# Patient Record
Sex: MALE | Race: WHITE | NOT HISPANIC OR LATINO | Employment: OTHER | URBAN - NONMETROPOLITAN AREA
[De-identification: names, ages, dates, MRNs, and addresses within clinical notes are randomized per-mention and may not be internally consistent; named-entity substitution may affect disease eponyms.]

---

## 2022-06-20 ENCOUNTER — OFFICE VISIT (OUTPATIENT)
Dept: OTOLARYNGOLOGY | Facility: CLINIC | Age: 87
End: 2022-06-20

## 2022-06-20 VITALS
BODY MASS INDEX: 23.19 KG/M2 | DIASTOLIC BLOOD PRESSURE: 65 MMHG | HEART RATE: 64 BPM | SYSTOLIC BLOOD PRESSURE: 149 MMHG | HEIGHT: 68 IN | RESPIRATION RATE: 16 BRPM | TEMPERATURE: 97.7 F | WEIGHT: 153 LBS

## 2022-06-20 DIAGNOSIS — H61.21 IMPACTED CERUMEN OF RIGHT EAR: ICD-10-CM

## 2022-06-20 DIAGNOSIS — H90.6 MIXED CONDUCTIVE AND SENSORINEURAL HEARING LOSS OF BOTH EARS: Primary | ICD-10-CM

## 2022-06-20 PROCEDURE — 99203 OFFICE O/P NEW LOW 30 MIN: CPT | Performed by: NURSE PRACTITIONER

## 2022-06-20 RX ORDER — CIPROFLOXACIN AND DEXAMETHASONE 3; 1 MG/ML; MG/ML
4 SUSPENSION/ DROPS AURICULAR (OTIC) 2 TIMES DAILY
COMMUNITY

## 2022-06-20 RX ORDER — CARBOXYMETHYLCELLULOSE SODIUM 5 MG/ML
SOLUTION/ DROPS OPHTHALMIC 3 TIMES DAILY PRN
COMMUNITY

## 2022-06-20 RX ORDER — NAPROXEN 500 MG/1
500 TABLET ORAL 2 TIMES DAILY WITH MEALS
COMMUNITY

## 2022-06-20 RX ORDER — AMLODIPINE BESYLATE AND BENAZEPRIL HYDROCHLORIDE 10; 20 MG/1; MG/1
1 CAPSULE ORAL DAILY
COMMUNITY

## 2022-06-20 RX ORDER — LISINOPRIL 40 MG/1
40 TABLET ORAL DAILY
COMMUNITY

## 2022-06-20 RX ORDER — TERAZOSIN 10 MG/1
10 CAPSULE ORAL NIGHTLY
COMMUNITY

## 2022-06-20 RX ORDER — POTASSIUM CHLORIDE 750 MG/1
10 TABLET, FILM COATED, EXTENDED RELEASE ORAL 2 TIMES DAILY
COMMUNITY

## 2022-06-20 RX ORDER — METOPROLOL TARTRATE 100 MG/1
100 TABLET ORAL 2 TIMES DAILY
COMMUNITY

## 2022-06-20 RX ORDER — FINASTERIDE 5 MG/1
5 TABLET, FILM COATED ORAL DAILY
COMMUNITY

## 2022-06-20 NOTE — PROGRESS NOTES
"YOB: 1934  Location: [facility]  Location Address: [address] [place] [address], [facility], [address] [place] [address]  Location Phone: [phone]    Chief Complaint   Patient presents with   • hearing loss       History of Present Illness  Aureliano Torres is a 87 y.o. male.  Aureliano Torres is here for evaluation of ENT complaints. The patient has had problems with hearing loss. Patient family reports he had an ear infection in April in which he lost hearing in both ears right > left.   He was given oral antibiotics which they feel has improved hearing, he has not had a repeat hearing test since that time. During the ongoing infection he also complained of some balance issues, which he feels as if has improved as well. He denies room spinning.  Patient states that he \"hears a heart beat\" inside both ears.  He declines ear pain or room spinning.  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     • carboxymethylcellulose (REFRESH PLUS) 0.5 % solution 3 (Three) Times a Day As Needed for Dry Eyes.     • ciprofloxacin-dexamethasone (CIPRODEX) 0.3-0.1 % otic suspension 4 drops 2 (Two) Times a Day.     • finasteride (PROSCAR) 5 MG tablet Take 5 mg by mouth Daily.     • lisinopril (PRINIVIL,ZESTRIL) 40 MG tablet Take 40 mg by mouth Daily.     • METFORMIN HCL PO Take 1,000 mg by mouth.     • metoprolol tartrate (LOPRESSOR) 100 MG tablet Take 100 mg by mouth 2 (Two) Times a Day.     • naproxen (NAPROSYN) 500 MG tablet Take 500 mg by " mouth 2 (Two) Times a Day With Meals.     • potassium chloride (K-DUR,KLOR-CON) 10 MEQ ER tablet Take 10 mEq by mouth 2 (Two) Times a Day.     • terazosin (HYTRIN) 10 MG capsule Take 10 mg by mouth Every Night.         Aspirin    History reviewed. No pertinent family history.    Social History     Socioeconomic History   • Marital status:    Tobacco Use   • Smoking status: Former Smoker   • Smokeless tobacco: Never Used   Vaping Use   • Vaping Use: Never used   Substance and Sexual Activity   • Alcohol use: Yes     Alcohol/week: 7.0 standard drinks     Types: 7 Cans of beer per week   • Drug use: Never       Review of Systems   Constitutional: Negative.    HENT: Positive for hearing loss and tinnitus. Negative for ear discharge and ear pain.    Eyes: Negative.    Respiratory: Negative.    Cardiovascular: Negative.    Genitourinary: Negative.    Neurological: Negative for dizziness.       Vitals:    06/20/22 1454   BP: 149/65   Pulse: 64   Resp: 16   Temp: 97.7 °F (36.5 °C)       Body mass index is 23.26 kg/m².    Objective     Physical Exam  Vitals reviewed.   Constitutional:       Appearance: He is normal weight.   HENT:      Head: Normocephalic.      Right Ear: Tympanic membrane, ear canal and external ear normal. Decreased hearing noted.      Left Ear: Tympanic membrane, ear canal and external ear normal. Decreased hearing noted.      Nose: Nose normal.      Mouth/Throat:      Lips: Pink.      Mouth: Mucous membranes are moist.   Musculoskeletal:      Cervical back: Full passive range of motion without pain and normal range of motion.   Neurological:      Mental Status: He is alert.         Assessment & Plan   Diagnoses and all orders for this visit:    1. 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 Impacted cerumen of right ear      * Surgery not found *  No orders of the defined types were placed in this encounter.    Return for Recheck.     Will obtain a repeat hearing  test as va hearing test was performed during reported active otitis media     Patient Instructions   CONTACT INFORMATION:  The main office phone number is [phone]. For emergencies after hours and on weekends, this number will convert over to our answering service and the on call provider will answer. Please try to keep non emergent phone calls/ questions to office hours 9am-5pm Monday through Friday.      Twinklr  As an alternative, you can sign up and use the Epic MyChart system for more direct and quicker access for non emergent questions/ problems.  [employer] allows you to send messages to your doctor, view your test results, renew your prescriptions, schedule appointments, and more. To sign up, go to [place] and click on the Sign Up Now link in the New User? box. Enter your Twinklr Activation Code exactly as it appears below along with the last four digits of your Social Security Number and your Date of Birth () to complete the sign-up process. If you do not sign up before the expiration date, you must request a new code.     Twinklr Activation Code: Activation code not generated  Current Twinklr Status: Active     If you have questions, you can email [email or web address] or call [phone] to talk to our Twinklr staff. Remember, Twinklr is NOT to be used for urgent needs. For medical emergencies, dial 911.     IF YOU SMOKE OR USE TOBACCO PLEASE READ THE FOLLOWING:  Why is smoking bad for me?  Smoking increases the risk of heart disease, lung disease, vascular disease, stroke, and cancer. If you smoke, STOP!        IF YOU SMOKE OR USE TOBACCO PLEASE READ THE FOLLOWING:  Why is smoking bad for me?  Smoking increases the risk of heart disease, lung disease, vascular disease, stroke, and cancer. If you smoke, STOP!     For more information:  Quit Now [facility]  -QUIT-NOW  [email or web address].[email or web address]/en-[email or web address]/ HEARING LOSS       Hearing loss is the third most  common health condition in the [place] affecting more than 1 of every 10 people.  This means that over 28 million Americans suffer from hearing loss.  The condition varies with age: 1 out of every 25 children, 1 out of every 14 aged 14-44 years old, 1 out of every 7 adults aged 45-65 year old, and 1 out of every 3 adults over the age of 65 years old.    The ear works by receiving sound vibrations, amplifying the sound, and then converting the mechanical vibration into an electrical signal that is sent to the hearing center of the brain.  The ear has three basic parts, each with very specific functions:  The External Ear   This is made up of the parts of the ear you can see (the auricle), and the ear canal.  These structures function to funnel the sound vibrations down into the ear so that they can be processed.  The Middle Ear  The external ear and the middle ear are  by the eardrum (tympanic membrane).  The middle ear is an air-filled space that houses the middle ear bones (ossicles).  Sound waves hit the eardrum and cause it to vibrate.  The vibrations are then transferred to the ear bones that amplify the sound and transfer it to the inner ear (cochlea).  The Inner Ear  The inner ear is a snail-shaped bone that contains a fluid-filled membrane inside another fluid-filled membrane.  It acts like a battery to transform the mechanical vibrations into and electrical signal.  It does this with very delicate hair cells that rest on top of the inner ear membranes.  The electrical signal is then shipped out of the inner ear to the brain where it is processed and understood as sound.    Disease in any one of these parts of the ear can cause hearing loss, but can do so in two different ways.  When there is a problem with the process of bringing the sound to the inner ear (i.e. problems in the external or middle ear) it is called a conductive hearing loss.  Problems in the inner ear or nerves of hearing care  called sensorineural hearing losses.  Often times, however, there is a combination of the types of hearing losses or a mixed hearing loss.  Conductive Hearing Loss  Impairment of the transfer of sound into the inner ear because of problems with the external ear, the eardrum or the middle ear can cause conductive hearing loss.  Conductive losses can often be lessened or cured with medication or surgery, depending on the etiology.  Causes of Conductive Hearing Losses:  • Wax occlusion of the ear canal  • Eardrum perforations  • Stiffened or scarred eardrums  • Fluid in the middle earspace (otitis media with effusion)  • Middle ear infections (acute otitis media)  • Scarring or fixation of the ear bones (tympanosclerosis, otosclerosis)  • Dislocation of the ear bones  • Cholesteatoma: this is a “skin cyst” that develops due to severely retracted eardrums or from skin growth into the middle ear from a chronic eardrum perforation.  Over time the cyst can grow and erode the bones of hearing.  • Middle ear tumors or masses  Sensorineural Hearing Loss  Deficits in hearing due to problems in the inner ear or nerves of hearing are termed sensorineural losses.  These are usually due to damage to the microscopic hair cells in the cochlea, or due to loss of the nerve cells in the hearing nerve.  For the most part, this type of hearing loss cannot be repaired with medication or surgery, except in rare instances.  Often times, however, it can be helped with hearing aids and rarely with cochlear implantation.  This type of loss can also be associated with tinnitus (ringing of the ears) and vertigo (dizziness).  Causes of Sensorineural Hearing Losses:  • Damage from noise exposure  • Aging:  this is often a slow, progressive loss of the high frequencies  • Infection (labrynthitis)  • Secondary loss from the effects of meningitis  • Genetic/familial related hearing loss  • Autoimmune hearing loss (a rheumatoid-like  process)  • Temporal bond fracture (severe fracture of the bone around or through the inner ear)  • Medication induced damage (chemotherapy, high dose IV antibiotics, diuretics)  • Inner ear and skull base tumors (acoustic neuroma, memingioma)        Specific Causes    Ear Infections and Effusion in Children  Any time the middle ear is filled with fluid, as in an active infection or in middle ear effusion, there can be a conductive hearing.  Ear infections are the most common cause of infant hearing loss and are a very treatable type of hearing loss.  Usually the middle ear is filled with air, which allows the bones of hearing to freely move.  When there is fluid in this space it slows the vibration and is literally like trying to hear underwater.  Most of the time this infection or fluid can be treated with medication.  If this does not happen, often myringotomy tube placement can allow for drainage of fluid and allow the middle ear space to remain ventilated.  Congenital Hearing Loss  Hearing loss is the most common birth defect, affecting 3 in every 1000 children born in the [place].  If untreated, this can lead to significant problems in speech, language, and learning.  Recently, most hospitals have early infant screening that can identify newborns with hearing problems.  With accurate diagnosis, these children can often be treated with hearing aids and other forms of treatment that can allow them to develop normal speech and learning.  Noise Induced Hearing Loss  According to the National Institutes of Health, approximately 10 million Americans have hearing losses that are a least partially attributable to loud noise exposure.  Exposures that can cause permanent damage vary, but short exposures of very loud quality (gunfire) can be as bad as longer exposures at lower levels.  Most conversations are at about 65-70 decibels.  Levels over 85dB, with exposures of up to 8 hours a day, will produce permanent  hearing loss after many years of exposure.  A stereo headset turned up full blast (about 110 dB) can cause a significant hearing loss in less than a half an hour.  These losses are due to damage of the hair cells of the inner ear from the excessive vibrational acoustic trauma of the loud noise.  Since this loss is usually nonreversible, hearing protection is essential.  Foam earplugs can provide 15-30dB of noise protection.  Age Related Hearing Loss (Presbyacusis)  Age related hearing loss is another very common form of hearing loss in the elderly.  It is usually a slowly progressive, high frequency sensorineural loss that in nonreversible.  Not all elderly people will have hearing loss as this is very much related to the genetic makeup of the individual.  This can cause the patient to withdraw from social situations, or cause them to seem to have a mood change because of the frustration of not being able to hear a conversation or having to ask people to repeat things.  Most of the time, this can be well treated by amplifying sounds with a hearing aid.    Hearing loss can have a large impact in the way we function on a day to day basis with our environment.  We are fortunate that most hearing loss can be treated by hearing aids or medical and surgical treatments.  If you think you may be suffering from hearing loss, an evaluation by our doctor can help identify the cause and the specific treatment that can help improve your hearing.

## 2022-06-20 NOTE — PATIENT INSTRUCTIONS
CONTACT INFORMATION:  The main office phone number is [phone]. For emergencies after hours and on weekends, this number will convert over to our answering service and the on call provider will answer. Please try to keep non emergent phone calls/ questions to office hours 9am-5pm Monday through Friday.      Zazum  As an alternative, you can sign up and use the Epic MyChart system for more direct and quicker access for non emergent questions/ problems.  [employer] allows you to send messages to your doctor, view your test results, renew your prescriptions, schedule appointments, and more. To sign up, go to [employer] and click on the Sign Up Now link in the New User? box. Enter your Zazum Activation Code exactly as it appears below along with the last four digits of your Social Security Number and your Date of Birth () to complete the sign-up process. If you do not sign up before the expiration date, you must request a new code.     Zazum Activation Code: Activation code not generated  Current Zazum Status: Active     If you have questions, you can email [email or web address] or call [phone] to talk to our Zazum staff. Remember, Zazum is NOT to be used for urgent needs. For medical emergencies, dial 911.     IF YOU SMOKE OR USE TOBACCO PLEASE READ THE FOLLOWING:  Why is smoking bad for me?  Smoking increases the risk of heart disease, lung disease, vascular disease, stroke, and cancer. If you smoke, STOP!        IF YOU SMOKE OR USE TOBACCO PLEASE READ THE FOLLOWING:  Why is smoking bad for me?  Smoking increases the risk of heart disease, lung disease, vascular disease, stroke, and cancer. If you smoke, STOP!     For more information:  Quit Now [place]  -QUIT-NOW  [email or web address]://[email or web address].[email or web address]/[email or web address]/ HEARING LOSS       Hearing loss is the third most common health condition in the [place] affecting more than 1 of every 10 people.  This means that  over 28 million Americans suffer from hearing loss.  The condition varies with age: 1 out of every 25 children, 1 out of every 14 aged 14-44 years old, 1 out of every 7 adults aged 45-65 year old, and 1 out of every 3 adults over the age of 65 years old.    The ear works by receiving sound vibrations, amplifying the sound, and then converting the mechanical vibration into an electrical signal that is sent to the hearing center of the brain.  The ear has three basic parts, each with very specific functions:  The External Ear   This is made up of the parts of the ear you can see (the auricle), and the ear canal.  These structures function to funnel the sound vibrations down into the ear so that they can be processed.  The Middle Ear  The external ear and the middle ear are  by the eardrum (tympanic membrane).  The middle ear is an air-filled space that houses the middle ear bones (ossicles).  Sound waves hit the eardrum and cause it to vibrate.  The vibrations are then transferred to the ear bones that amplify the sound and transfer it to the inner ear (cochlea).  The Inner Ear  The inner ear is a snail-shaped bone that contains a fluid-filled membrane inside another fluid-filled membrane.  It acts like a battery to transform the mechanical vibrations into and electrical signal.  It does this with very delicate hair cells that rest on top of the inner ear membranes.  The electrical signal is then shipped out of the inner ear to the brain where it is processed and understood as sound.    Disease in any one of these parts of the ear can cause hearing loss, but can do so in two different ways.  When there is a problem with the process of bringing the sound to the inner ear (i.e. problems in the external or middle ear) it is called a conductive hearing loss.  Problems in the inner ear or nerves of hearing care called sensorineural hearing losses.  Often times, however, there is a combination of the types of  hearing losses or a mixed hearing loss.  Conductive Hearing Loss  Impairment of the transfer of sound into the inner ear because of problems with the external ear, the eardrum or the middle ear can cause conductive hearing loss.  Conductive losses can often be lessened or cured with medication or surgery, depending on the etiology.  Causes of Conductive Hearing Losses:  Wax occlusion of the ear canal  Eardrum perforations  Stiffened or scarred eardrums  Fluid in the middle earspace (otitis media with effusion)  Middle ear infections (acute otitis media)  Scarring or fixation of the ear bones (tympanosclerosis, otosclerosis)  Dislocation of the ear bones  Cholesteatoma: this is a “skin cyst” that develops due to severely retracted eardrums or from skin growth into the middle ear from a chronic eardrum perforation.  Over time the cyst can grow and erode the bones of hearing.  Middle ear tumors or masses  Sensorineural Hearing Loss  Deficits in hearing due to problems in the inner ear or nerves of hearing are termed sensorineural losses.  These are usually due to damage to the microscopic hair cells in the cochlea, or due to loss of the nerve cells in the hearing nerve.  For the most part, this type of hearing loss cannot be repaired with medication or surgery, except in rare instances.  Often times, however, it can be helped with hearing aids and rarely with cochlear implantation.  This type of loss can also be associated with tinnitus (ringing of the ears) and vertigo (dizziness).  Causes of Sensorineural Hearing Losses:  Damage from noise exposure  Aging:  this is often a slow, progressive loss of the high frequencies  Infection (labrynthitis)  Secondary loss from the effects of meningitis  Genetic/familial related hearing loss  Autoimmune hearing loss (a rheumatoid-like process)  Temporal bond fracture (severe fracture of the bone around or through the inner ear)  Medication induced damage (chemotherapy, high dose  IV antibiotics, diuretics)  Inner ear and skull base tumors (acoustic neuroma, memingioma)        Specific Causes    Ear Infections and Effusion in Children  Any time the middle ear is filled with fluid, as in an active infection or in middle ear effusion, there can be a conductive hearing.  Ear infections are the most common cause of infant hearing loss and are a very treatable type of hearing loss.  Usually the middle ear is filled with air, which allows the bones of hearing to freely move.  When there is fluid in this space it slows the vibration and is literally like trying to hear underwater.  Most of the time this infection or fluid can be treated with medication.  If this does not happen, often myringotomy tube placement can allow for drainage of fluid and allow the middle ear space to remain ventilated.  Congenital Hearing Loss  Hearing loss is the most common birth defect, affecting 3 in every 1000 children born in the [place].  If untreated, this can lead to significant problems in speech, language, and learning.  Recently, most hospitals have early infant screening that can identify newborns with hearing problems.  With accurate diagnosis, these children can often be treated with hearing aids and other forms of treatment that can allow them to develop normal speech and learning.  Noise Induced Hearing Loss  According to the National Institutes of Health, approximately 10 million Americans have hearing losses that are a least partially attributable to loud noise exposure.  Exposures that can cause permanent damage vary, but short exposures of very loud quality (gunfire) can be as bad as longer exposures at lower levels.  Most conversations are at about 65-70 decibels.  Levels over 85dB, with exposures of up to 8 hours a day, will produce permanent hearing loss after many years of exposure.  A stereo headset turned up full blast (about 110 dB) can cause a significant hearing loss in less than a half  an hour.  These losses are due to damage of the hair cells of the inner ear from the excessive vibrational acoustic trauma of the loud noise.  Since this loss is usually nonreversible, hearing protection is essential.  Foam earplugs can provide 15-30dB of noise protection.  Age Related Hearing Loss (Presbyacusis)  Age related hearing loss is another very common form of hearing loss in the elderly.  It is usually a slowly progressive, high frequency sensorineural loss that in nonreversible.  Not all elderly people will have hearing loss as this is very much related to the genetic makeup of the individual.  This can cause the patient to withdraw from social situations, or cause them to seem to have a mood change because of the frustration of not being able to hear a conversation or having to ask people to repeat things.  Most of the time, this can be well treated by amplifying sounds with a hearing aid.    Hearing loss can have a large impact in the way we function on a day to day basis with our environment.  We are fortunate that most hearing loss can be treated by hearing aids or medical and surgical treatments.  If you think you may be suffering from hearing loss, an evaluation by our doctor can help identify the cause and the specific treatment that can help improve your hearing.

## 2022-07-08 NOTE — PROGRESS NOTES
AUDIOMETRIC EVALUATION      Name:  Aureliano Torres  :  1934  Age:  87 y.o.  Date of Evaluation:  2022       History:  Reason for visit:  Mr. Torres is seen today at the request of ROBER Marx for a hearing evaluation. Patient was referred to ENT clinic for bilateral mixed hearing loss. Patient is here today with his two daughters. Patient's daughter reports he lost his hearing around 2022. She states he went to the ER because he had fluid in both ears. He was put on antibiotics at this time. Patient reports patient his hearing has improved since this. [facility]. He does not wear hearing aids and has never worn hearing aids.     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 Guns (right-handed) no hearing protection  Aural Fullness:  no, both ears  Otalgia: no, both ears  Family history of hearing loss: yes, mothers side of family  Other significant history: high blood presure, type II diabetes      EVALUATION:            RESULTS:    Otoscopic Evaluation:  Bilateral: minimal cerumen, tympanic membrane visualized     Tympanometry (226 Hz):  Bilateral: Type As- low static compliance    Pure Tone Audiometry:    Right: mild precipitously sloping to severe mixed hearing loss   Left: mild precipitously sloping to profound sensorineural hearing loss     Speech Audiometry:   Right: Speech Reception Threshold (SRT) was obtained at 60 dBHL  Word Recognition scores- poor (54 - 64 %) using NU-6 List 4A, 25 words  Left: Speech Reception Threshold (SRT) was obtained at 40 dBHL  Word Recognition scores- good (78 - 88%) using NU-6 List 4A, 25 words    IMPRESSIONS:  Tympanometry showed normal middle ear pressure with decreased static compliance, consistent with hypomobile tympanic membrane,  for both ears. Pure tone thresholds for the right ear show a mild precipitously sloping to severe mixed hearing loss, suggesting abnormal outer/middle ear function and abnormal cochlear/retrocochlear function. Pure tone thresholds for the left ear show a mild precipitously sloping to profound sensorineural hearing loss, suggesting normal outer/middle ear function and abnormal cochlear/retrocochlear function. No significant improvement [facility]. Significant improvement [facility]. Large asymmetry with the right ear significantly worse than the left ear. Patient and daughters were counseled with regard to the findings.    Amplification needs:  Patient could benefit from hearing aids. Patient's word recognition scores were poor and good    Diagnosis:  1. Mixed conductive and sensorineural hearing loss of right ear with restricted hearing of left ear    2. Sensorineural hearing loss (SNHL) of left ear with restricted hearing of right ear         RECOMMENDATIONS/PLAN:  Follow-up recommendations per ROBER Marx    Audiologic follow-up in 1 year  Return for audiologic testing if noticing changes in hearing or concerns arise  Hearing aid evaluation and counseling upon medical clearance and patient motivation  Use communication strategies  Use hearing protection around loud noises     EDUCATION:  Discussed results and recommendations with patient. Questions were addressed and the patient was encouraged to contact our department should concerns arise.        SUSANA Elmore  Licensed Audiologist

## 2022-07-11 ENCOUNTER — OFFICE VISIT (OUTPATIENT)
Dept: OTOLARYNGOLOGY | Facility: CLINIC | Age: 87
End: 2022-07-11

## 2022-07-11 ENCOUNTER — PROCEDURE VISIT (OUTPATIENT)
Dept: OTOLARYNGOLOGY | Facility: CLINIC | Age: 87
End: 2022-07-11

## 2022-07-11 VITALS
HEART RATE: 55 BPM | DIASTOLIC BLOOD PRESSURE: 73 MMHG | WEIGHT: 150.6 LBS | BODY MASS INDEX: 22.82 KG/M2 | TEMPERATURE: 97.7 F | SYSTOLIC BLOOD PRESSURE: 151 MMHG | HEIGHT: 68 IN

## 2022-07-11 DIAGNOSIS — H90.A31 MIXED CONDUCTIVE AND SENSORINEURAL HEARING LOSS OF RIGHT EAR WITH RESTRICTED HEARING OF LEFT EAR: Primary | ICD-10-CM

## 2022-07-11 DIAGNOSIS — H90.A22 SENSORINEURAL HEARING LOSS (SNHL) OF LEFT EAR WITH RESTRICTED HEARING OF RIGHT EAR: ICD-10-CM

## 2022-07-11 DIAGNOSIS — H93.13 TINNITUS OF BOTH EARS: ICD-10-CM

## 2022-07-11 DIAGNOSIS — H90.6 MIXED CONDUCTIVE AND SENSORINEURAL HEARING LOSS OF BOTH EARS: ICD-10-CM

## 2022-07-11 PROCEDURE — 99213 OFFICE O/P EST LOW 20 MIN: CPT | Performed by: NURSE PRACTITIONER

## 2022-07-11 PROCEDURE — 92557 COMPREHENSIVE HEARING TEST: CPT

## 2022-07-11 PROCEDURE — 92567 TYMPANOMETRY: CPT

## 2022-07-11 NOTE — PATIENT INSTRUCTIONS
TINNITUS  Tinnitus (latin for ringing) is the sensation of noise in the ears. This symptom can be quite variable and disconcerting. Most people have had ringing in the ears but most do not require treatment. Only 6% of people have ringing troubling enough to seek treatment.    Symptoms can range from ringing to “noise” of any sort in the ear or ears. Timing can vary as well. There are no specific symptom requirements to have tinnitus. It is speculated that the inner ear hair cells (responsible for hearing) may be dying and causing the brain to seek additional input for sound that is missing. There are many theories for the etiology of tinnitus or ringing.    You may be referred for hearing testing or imaging of the ears/brain to try to determine what is causing ringing and how to best treat the condition.    Tinnitus Recommendations-  >Avoid loud or sudden noise  >Wear hearing protection in the presence of loud noise  >Avoid irritants like caffeine, nicotine, tonic water, malaria medications, alcohol, aspirin- please tell MD if you are taking any of these medications  >In a quiet environment or while sleeping, use a white noise generator or radio station to provide background noise  >Relaxation and stress management techniques are useful  >Biofeedback  >Complementary medicine may be of benefit  >Wear a hearing aid or tinnitus masker/retrainer  >Psychological counseling may be beneficial in some situations  >Exercise!!  >Restorative Sleep!!    Medical therapy for ringing (may include)-  Do nothing  Medications for ringing  IV medication  Ear perfusion- inner ear surgery to reduce ringing  Hearing Aids, Tinnitus maskers, Tinnitus retrainers  Biofeedback  Psychological counseling    All options will be reviewed at your visit. Treating tinnitus can be difficult and frustrating. There really is no cure but rather control. This can be time consuming to treat. The patient determines how much treatment is warranted if there  are no associated medical conditions requiring therapy. Please be patient.   HEARING LOSS       Hearing loss is the third most common health condition in the [place] affecting more than 1 of every 10 people.  This means that over 28 million Americans suffer from hearing loss.  The condition varies with age: 1 out of every 25 children, 1 out of every 14 aged 14-44 years old, 1 out of every 7 adults aged 45-65 year old, and 1 out of every 3 adults over the age of 65 years old.    The ear works by receiving sound vibrations, amplifying the sound, and then converting the mechanical vibration into an electrical signal that is sent to the hearing center of the brain.  The ear has three basic parts, each with very specific functions:  The External Ear   This is made up of the parts of the ear you can see (the auricle), and the ear canal.  These structures function to funnel the sound vibrations down into the ear so that they can be processed.  The Middle Ear  The external ear and the middle ear are  by the eardrum (tympanic membrane).  The middle ear is an air-filled space that houses the middle ear bones (ossicles).  Sound waves hit the eardrum and cause it to vibrate.  The vibrations are then transferred to the ear bones that amplify the sound and transfer it to the inner ear (cochlea).  The Inner Ear  The inner ear is a snail-shaped bone that contains a fluid-filled membrane inside another fluid-filled membrane.  It acts like a battery to transform the mechanical vibrations into and electrical signal.  It does this with very delicate hair cells that rest on top of the inner ear membranes.  The electrical signal is then shipped out of the inner ear to the brain where it is processed and understood as sound.    Disease in any one of these parts of the ear can cause hearing loss, but can do so in two different ways.  When there is a problem with the process of bringing the sound to the inner ear (i.e.  problems in the external or middle ear) it is called a conductive hearing loss.  Problems in the inner ear or nerves of hearing care called sensorineural hearing losses.  Often times, however, there is a combination of the types of hearing losses or a mixed hearing loss.  Conductive Hearing Loss  Impairment of the transfer of sound into the inner ear because of problems with the external ear, the eardrum or the middle ear can cause conductive hearing loss.  Conductive losses can often be lessened or cured with medication or surgery, depending on the etiology.  Causes of Conductive Hearing Losses:  Wax occlusion of the ear canal  Eardrum perforations  Stiffened or scarred eardrums  Fluid in the middle earspace (otitis media with effusion)  Middle ear infections (acute otitis media)  Scarring or fixation of the ear bones (tympanosclerosis, otosclerosis)  Dislocation of the ear bones  Cholesteatoma: this is a “skin cyst” that develops due to severely retracted eardrums or from skin growth into the middle ear from a chronic eardrum perforation.  Over time the cyst can grow and erode the bones of hearing.  Middle ear tumors or masses  Sensorineural Hearing Loss  Deficits in hearing due to problems in the inner ear or nerves of hearing are termed sensorineural losses.  These are usually due to damage to the microscopic hair cells in the cochlea, or due to loss of the nerve cells in the hearing nerve.  For the most part, this type of hearing loss cannot be repaired with medication or surgery, except in rare instances.  Often times, however, it can be helped with hearing aids and rarely with cochlear implantation.  This type of loss can also be associated with tinnitus (ringing of the ears) and vertigo (dizziness).  Causes of Sensorineural Hearing Losses:  Damage from noise exposure  Aging:  this is often a slow, progressive loss of the high frequencies  Infection (labrynthitis)  Secondary loss from the effects of  meningitis  Genetic/familial related hearing loss  Autoimmune hearing loss (a rheumatoid-like process)  Temporal bond fracture (severe fracture of the bone around or through the inner ear)  Medication induced damage (chemotherapy, high dose IV antibiotics, diuretics)  Inner ear and skull base tumors (acoustic neuroma, memingioma)        Specific Causes    Ear Infections and Effusion in Children  Any time the middle ear is filled with fluid, as in an active infection or in middle ear effusion, there can be a conductive hearing.  Ear infections are the most common cause of infant hearing loss and are a very treatable type of hearing loss.  Usually the middle ear is filled with air, which allows the bones of hearing to freely move.  When there is fluid in this space it slows the vibration and is literally like trying to hear underwater.  Most of the time this infection or fluid can be treated with medication.  If this does not happen, often myringotomy tube placement can allow for drainage of fluid and allow the middle ear space to remain ventilated.  Congenital Hearing Loss  Hearing loss is the most common birth defect, affecting 3 in every 1000 children born in the [place].  If untreated, this can lead to significant problems in speech, language, and learning.  Recently, most hospitals have early infant screening that can identify newborns with hearing problems.  With accurate diagnosis, these children can often be treated with hearing aids and other forms of treatment that can allow them to develop normal speech and learning.  Noise Induced Hearing Loss  According to the [employer], approximately 10 million Americans have hearing losses that are a least partially attributable to loud noise exposure.  Exposures that can cause permanent damage vary, but short exposures of very loud quality (gunfire) can be as bad as longer exposures at lower levels.  Most conversations are at about 65-70  decibels.  Levels over 85dB, with exposures of up to 8 hours a day, will produce permanent hearing loss after many years of exposure.  A stereo headset turned up full blast (about 110 dB) can cause a significant hearing loss in less than a half an hour.  These losses are due to damage of the hair cells of the inner ear from the excessive vibrational acoustic trauma of the loud noise.  Since this loss is usually nonreversible, hearing protection is essential.  Foam earplugs can provide 15-30dB of noise protection.  Age Related Hearing Loss (Presbyacusis)  Age related hearing loss is another very common form of hearing loss in the elderly.  It is usually a slowly progressive, high frequency sensorineural loss that in nonreversible.  Not all elderly people will have hearing loss as this is very much related to the genetic makeup of the individual.  This can cause the patient to withdraw from social situations, or cause them to seem to have a mood change because of the frustration of not being able to hear a conversation or having to ask people to repeat things.  Most of the time, this can be well treated by amplifying sounds with a hearing aid.    Hearing loss can have a large impact in the way we function on a day to day basis with our environment.  We are fortunate that most hearing loss can be treated by hearing aids or medical and surgical treatments.  If you think you may be suffering from hearing loss, an evaluation by our doctor can help identify the cause and the specific treatment that can help improve your hearing.

## 2022-07-11 NOTE — PROGRESS NOTES
YOB: 1934  Location: [facility]  Location Address: [address] [place] [address], [facility], [address] [place] [address]  Location Phone: [phone]    Chief Complaint   Patient presents with   • Ear Problem     Hearing loss follow up        History of Present Illness  Aureliano Torres is a 87 y.o. male.  Aureliano Torres is here for follow up of ENT complaints. The patient has had problems with hearing loss.   Patient states his hearing has been relatively unchanged since last visit   He denies otalgia or otorrhea  He is wanting to move forward with hearing aids at this time   Patient was diagnosed with right otitis in April, but he states symptoms have improved since taking antibiotics at that time   He continues to have ongoing tinnitus, denies dizziness, balance issues or room spinning   Patient states he has significant work related noise exposure in the past as well as family history of hearing loss         Procedure visit with Fide Moore AUD (2022)       Past Medical History:   Diagnosis Date   • Balance problem    • Hearing loss    • Hypertension        Past Surgical History:   Procedure Laterality Date   • CYST REMOVAL     • HERNIA REPAIR      twice       Outpatient Medications Marked as Taking for the 22 encounter (Office Visit) with Kenia Rodriguez APRN   Medication Sig Dispense Refill   • amLODIPine-benazepril (LOTREL) 10-20 MG per capsule Take 1 capsule by mouth Daily.     • carboxymethylcellulose (REFRESH PLUS) 0.5 % solution 3 (Three) Times a Day As Needed for Dry Eyes.     • ciprofloxacin-dexamethasone (CIPRODEX) 0.3-0.1 % otic suspension 4 drops 2 (Two) Times a Day.     • finasteride (PROSCAR) 5 MG tablet Take 5 mg by mouth Daily.     • lisinopril (PRINIVIL,ZESTRIL) 40 MG tablet Take 40 mg by mouth Daily.     • METFORMIN HCL PO Take 1,000 mg by mouth.     • metoprolol tartrate (LOPRESSOR) 100 MG tablet Take 100 mg by mouth 2 (Two) Times a Day.     • naproxen (NAPROSYN) 500 MG  tablet Take 500 mg by mouth 2 (Two) Times a Day With Meals.     • potassium chloride (K-DUR,KLOR-CON) 10 MEQ ER tablet Take 10 mEq by mouth 2 (Two) Times a Day.     • terazosin (HYTRIN) 10 MG capsule Take 10 mg by mouth Every Night.         Aspirin    History reviewed. No pertinent family history.    Social History     Socioeconomic History   • Marital status:    Tobacco Use   • Smoking status: Former Smoker   • Smokeless tobacco: Never Used   Vaping Use   • Vaping Use: Never used   Substance and Sexual Activity   • Alcohol use: Yes     Alcohol/week: 7.0 standard drinks     Types: 7 Cans of beer per week   • Drug use: Never       Review of Systems   Constitutional: Negative.    HENT: Positive for hearing loss and tinnitus. Negative for ear discharge and ear pain.    Eyes: Negative.    Respiratory: Negative.    Cardiovascular: Negative.    Gastrointestinal: Negative.    Endocrine: Negative.    Genitourinary: Negative.    Neurological: Negative.  Negative for dizziness.       Vitals:    07/11/22 0934   BP: 151/73   Pulse: 55   Temp: 97.7 °F (36.5 °C)       Body mass index is 22.9 kg/m².    Objective     Physical Exam  Vitals reviewed.   Constitutional:       Appearance: He is normal weight.   HENT:      Head: Normocephalic.      Right Ear: Tympanic membrane, ear canal and external ear normal. Decreased hearing noted.      Left Ear: Tympanic membrane, ear canal and external ear normal. Decreased hearing noted.      Nose: Nose normal.   Musculoskeletal:      Cervical back: Full passive range of motion without pain.   Neurological:      Mental Status: He is alert.         Assessment & Plan   Diagnoses and all orders for this visit:    1. Mixed conductive and sensorineural hearing loss of right ear with restricted hearing of left ear (Primary)    2. Sensorineural hearing loss (SNHL) of left ear with restricted hearing of right ear    3. Mixed conductive and sensorineural hearing loss of both ears    4. Tinnitus of  both ears      * Surgery not found *  No orders of the defined types were placed in this encounter.    Return in about 6 months (around 1/11/2023) for Recheck.     Discussed options of ct scan/mri to evaluate asymmetrical hearing loss/pulsatile tinnitus   Patient and family would like to postpone at this time and move forward with hearing aids considering his age and symptom improvement since initial infection in April   Discussed need for ongoing monitoring of hearing and routine follow up      Patient Instructions   TINNITUS  Tinnitus (latin for ringing) is the sensation of noise in the ears. This symptom can be quite variable and disconcerting. Most people have had ringing in the ears but most do not require treatment. Only 6% of people have ringing troubling enough to seek treatment.    Symptoms can range from ringing to “noise” of any sort in the ear or ears. Timing can vary as well. There are no specific symptom requirements to have tinnitus. It is speculated that the inner ear hair cells (responsible for hearing) may be dying and causing the brain to seek additional input for sound that is missing. There are many theories for the etiology of tinnitus or ringing.    You may be referred for hearing testing or imaging of the ears/brain to try to determine what is causing ringing and how to best treat the condition.    Tinnitus Recommendations-  >Avoid loud or sudden noise  >Wear hearing protection in the presence of loud noise  >Avoid irritants like caffeine, nicotine, tonic water, malaria medications, alcohol, aspirin- please tell MD if you are taking any of these medications  >In a quiet environment or while sleeping, use a white noise generator or radio station to provide background noise  >Relaxation and stress management techniques are useful  >Biofeedback  >Complementary medicine may be of benefit  >Wear a hearing aid or tinnitus masker/retrainer  >Psychological counseling may be beneficial in some  situations  >Exercise!!  >Restorative Sleep!!    Medical therapy for ringing (may include)-  Do nothing  Medications for ringing  IV medication  Ear perfusion- inner ear surgery to reduce ringing  Hearing Aids, Tinnitus maskers, Tinnitus retrainers  Biofeedback  Psychological counseling    All options will be reviewed at your visit. Treating tinnitus can be difficult and frustrating. There really is no cure but rather control. This can be time consuming to treat. The patient determines how much treatment is warranted if there are no associated medical conditions requiring therapy. Please be patient.   HEARING LOSS       Hearing loss is the third most common health condition in the [place] affecting more than 1 of every 10 people.  This means that over 28 million Americans suffer from hearing loss.  The condition varies with age: 1 out of every 25 children, 1 out of every 14 aged 14-44 years old, 1 out of every 7 adults aged 45-65 year old, and 1 out of every 3 adults over the age of 65 years old.    The ear works by receiving sound vibrations, amplifying the sound, and then converting the mechanical vibration into an electrical signal that is sent to the hearing center of the brain.  The ear has three basic parts, each with very specific functions:  The External Ear   This is made up of the parts of the ear you can see (the auricle), and the ear canal.  These structures function to funnel the sound vibrations down into the ear so that they can be processed.  The Middle Ear  The external ear and the middle ear are  by the eardrum (tympanic membrane).  The middle ear is an air-filled space that houses the middle ear bones (ossicles).  Sound waves hit the eardrum and cause it to vibrate.  The vibrations are then transferred to the ear bones that amplify the sound and transfer it to the inner ear (cochlea).  The Inner Ear  The inner ear is a snail-shaped bone that contains a fluid-filled membrane inside  another fluid-filled membrane.  It acts like a battery to transform the mechanical vibrations into and electrical signal.  It does this with very delicate hair cells that rest on top of the inner ear membranes.  The electrical signal is then shipped out of the inner ear to the brain where it is processed and understood as sound.    Disease in any one of these parts of the ear can cause hearing loss, but can do so in two different ways.  When there is a problem with the process of bringing the sound to the inner ear (i.e. problems in the external or middle ear) it is called a conductive hearing loss.  Problems in the inner ear or nerves of hearing care called sensorineural hearing losses.  Often times, however, there is a combination of the types of hearing losses or a mixed hearing loss.  Conductive Hearing Loss  Impairment of the transfer of sound into the inner ear because of problems with the external ear, the eardrum or the middle ear can cause conductive hearing loss.  Conductive losses can often be lessened or cured with medication or surgery, depending on the etiology.  Causes of Conductive Hearing Losses:  • Wax occlusion of the ear canal  • Eardrum perforations  • Stiffened or scarred eardrums  • Fluid in the middle earspace (otitis media with effusion)  • Middle ear infections (acute otitis media)  • Scarring or fixation of the ear bones (tympanosclerosis, otosclerosis)  • Dislocation of the ear bones  • Cholesteatoma: this is a “skin cyst” that develops due to severely retracted eardrums or from skin growth into the middle ear from a chronic eardrum perforation.  Over time the cyst can grow and erode the bones of hearing.  • Middle ear tumors or masses  Sensorineural Hearing Loss  Deficits in hearing due to problems in the inner ear or nerves of hearing are termed sensorineural losses.  These are usually due to damage to the microscopic hair cells in the cochlea, or due to loss of the nerve cells in the  hearing nerve.  For the most part, this type of hearing loss cannot be repaired with medication or surgery, except in rare instances.  Often times, however, it can be helped with hearing aids and rarely with cochlear implantation.  This type of loss can also be associated with tinnitus (ringing of the ears) and vertigo (dizziness).  Causes of Sensorineural Hearing Losses:  • Damage from noise exposure  • Aging:  this is often a slow, progressive loss of the high frequencies  • Infection (labrynthitis)  • Secondary loss from the effects of meningitis  • Genetic/familial related hearing loss  • Autoimmune hearing loss (a rheumatoid-like process)  • Temporal bond fracture (severe fracture of the bone around or through the inner ear)  • Medication induced damage (chemotherapy, high dose IV antibiotics, diuretics)  • Inner ear and skull base tumors (acoustic neuroma, memingioma)        Specific Causes    Ear Infections and Effusion in Children  Any time the middle ear is filled with fluid, as in an active infection or in middle ear effusion, there can be a conductive hearing.  Ear infections are the most common cause of infant hearing loss and are a very treatable type of hearing loss.  Usually the middle ear is filled with air, which allows the bones of hearing to freely move.  When there is fluid in this space it slows the vibration and is literally like trying to hear underwater.  Most of the time this infection or fluid can be treated with medication.  If this does not happen, often myringotomy tube placement can allow for drainage of fluid and allow the middle ear space to remain ventilated.  Congenital Hearing Loss  Hearing loss is the most common birth defect, affecting 3 in every 1000 children born in the [place].  If untreated, this can lead to significant problems in speech, language, and learning.  Recently, most hospitals have early infant screening that can identify newborns with hearing problems.   With accurate diagnosis, these children can often be treated with hearing aids and other forms of treatment that can allow them to develop normal speech and learning.  Noise Induced Hearing Loss  According to the National Institutes of [employer], approximately 10 million Americans have hearing losses that are a least partially attributable to loud noise exposure.  Exposures that can cause permanent damage vary, but short exposures of very loud quality (gunfire) can be as bad as longer exposures at lower levels.  Most conversations are at about 65-70 decibels.  Levels over 85dB, with exposures of up to 8 hours a day, will produce permanent hearing loss after many years of exposure.  A stereo headset turned up full blast (about 110 dB) can cause a significant hearing loss in less than a half an hour.  These losses are due to damage of the hair cells of the inner ear from the excessive vibrational acoustic trauma of the loud noise.  Since this loss is usually nonreversible, hearing protection is essential.  Foam earplugs can provide 15-30dB of noise protection.  Age Related Hearing Loss (Presbyacusis)  Age related hearing loss is another very common form of hearing loss in the elderly.  It is usually a slowly progressive, high frequency sensorineural loss that in nonreversible.  Not all elderly people will have hearing loss as this is very much related to the genetic makeup of the individual.  This can cause the patient to withdraw from social situations, or cause them to seem to have a mood change because of the frustration of not being able to hear a conversation or having to ask people to repeat things.  Most of the time, this can be well treated by amplifying sounds with a hearing aid.    Hearing loss can have a large impact in the way we function on a day to day basis with our environment.  We are fortunate that most hearing loss can be treated by hearing aids or medical and surgical treatments.  If you think you may  be suffering from hearing loss, an evaluation by our doctor can help identify the cause and the specific treatment that can help improve your hearing.

## 2022-07-12 ENCOUNTER — TELEPHONE (OUTPATIENT)
Dept: OTOLARYNGOLOGY | Facility: CLINIC | Age: 87
End: 2022-07-12

## 2022-07-12 NOTE — TELEPHONE ENCOUNTER
Caller: DEBORAH DALTON     Relationship: [unfilled] DAUGHTER    Best call back number: [phone]    What is your medical concern? PT [facility] IN [facility] TO GET HEARING AID. PLEASE FAX [phone].